# Patient Record
Sex: MALE | Race: WHITE | NOT HISPANIC OR LATINO | URBAN - METROPOLITAN AREA
[De-identification: names, ages, dates, MRNs, and addresses within clinical notes are randomized per-mention and may not be internally consistent; named-entity substitution may affect disease eponyms.]

---

## 2017-12-05 ENCOUNTER — EMERGENCY (EMERGENCY)
Facility: HOSPITAL | Age: 27
LOS: 1 days | Discharge: HOME | End: 2017-12-05
Attending: STUDENT IN AN ORGANIZED HEALTH CARE EDUCATION/TRAINING PROGRAM | Admitting: STUDENT IN AN ORGANIZED HEALTH CARE EDUCATION/TRAINING PROGRAM
Payer: COMMERCIAL

## 2017-12-05 VITALS
OXYGEN SATURATION: 99 % | SYSTOLIC BLOOD PRESSURE: 160 MMHG | TEMPERATURE: 98 F | HEART RATE: 80 BPM | HEIGHT: 68 IN | DIASTOLIC BLOOD PRESSURE: 90 MMHG | WEIGHT: 199.96 LBS | RESPIRATION RATE: 18 BRPM

## 2017-12-05 PROCEDURE — 99283 EMERGENCY DEPT VISIT LOW MDM: CPT

## 2017-12-05 RX ORDER — IBUPROFEN 200 MG
800 TABLET ORAL ONCE
Qty: 0 | Refills: 0 | Status: DISCONTINUED | OUTPATIENT
Start: 2017-12-05 | End: 2017-12-09

## 2017-12-05 RX ORDER — TETANUS TOXOID, REDUCED DIPHTHERIA TOXOID AND ACELLULAR PERTUSSIS VACCINE, ADSORBED 5; 2.5; 8; 8; 2.5 [IU]/.5ML; [IU]/.5ML; UG/.5ML; UG/.5ML; UG/.5ML
0.5 SUSPENSION INTRAMUSCULAR ONCE
Qty: 0 | Refills: 0 | Status: DISCONTINUED | OUTPATIENT
Start: 2017-12-05 | End: 2017-12-09

## 2017-12-05 RX ORDER — METHOCARBAMOL 500 MG/1
1 TABLET, FILM COATED ORAL
Qty: 15 | Refills: 0 | OUTPATIENT
Start: 2017-12-05 | End: 2017-12-10

## 2017-12-05 RX ORDER — METHOCARBAMOL 500 MG/1
1500 TABLET, FILM COATED ORAL ONCE
Qty: 0 | Refills: 0 | Status: DISCONTINUED | OUTPATIENT
Start: 2017-12-05 | End: 2017-12-09

## 2017-12-05 RX ORDER — IBUPROFEN 200 MG
1 TABLET ORAL
Qty: 20 | Refills: 0 | OUTPATIENT
Start: 2017-12-05 | End: 2017-12-10

## 2017-12-05 NOTE — ED PROVIDER NOTE - PROGRESS NOTE DETAILS
After being in ER for approximately 6 hours pt states he can no longer stay for imaging or evaluation. Also just found out his mother was just admitted to another hospital and needs to leave. After being in ER for approximately 6 hours pt states he can no longer stay for imaging or medication. Also just found out his mother was just admitted to another hospital and needs to leave.
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
## 2019-01-20 ENCOUNTER — EMERGENCY (EMERGENCY)
Facility: HOSPITAL | Age: 29
LOS: 1 days | Discharge: HOME | End: 2019-01-20
Attending: STUDENT IN AN ORGANIZED HEALTH CARE EDUCATION/TRAINING PROGRAM
Payer: SELF-PAY

## 2019-01-20 VITALS
HEART RATE: 94 BPM | HEIGHT: 68 IN | DIASTOLIC BLOOD PRESSURE: 68 MMHG | RESPIRATION RATE: 18 BRPM | TEMPERATURE: 99 F | WEIGHT: 184.97 LBS | SYSTOLIC BLOOD PRESSURE: 123 MMHG | OXYGEN SATURATION: 101 %

## 2019-01-20 VITALS
TEMPERATURE: 99 F | RESPIRATION RATE: 18 BRPM | DIASTOLIC BLOOD PRESSURE: 77 MMHG | HEART RATE: 103 BPM | OXYGEN SATURATION: 97 % | SYSTOLIC BLOOD PRESSURE: 108 MMHG

## 2019-01-20 LAB
ALBUMIN SERPL ELPH-MCNC: 4.6 G/DL — SIGNIFICANT CHANGE UP (ref 3.3–5.2)
ALP SERPL-CCNC: 66 U/L — SIGNIFICANT CHANGE UP (ref 40–120)
ALT FLD-CCNC: 43 U/L — HIGH
AMPHET UR-MCNC: NEGATIVE — SIGNIFICANT CHANGE UP
ANION GAP SERPL CALC-SCNC: 16 MMOL/L — SIGNIFICANT CHANGE UP (ref 5–17)
APAP SERPL-MCNC: <7.5 UG/ML — LOW (ref 10–26)
APPEARANCE UR: CLEAR — SIGNIFICANT CHANGE UP
AST SERPL-CCNC: 28 U/L — SIGNIFICANT CHANGE UP
BARBITURATES UR SCN-MCNC: NEGATIVE — SIGNIFICANT CHANGE UP
BASOPHILS # BLD AUTO: 0 K/UL — SIGNIFICANT CHANGE UP (ref 0–0.2)
BENZODIAZ UR-MCNC: NEGATIVE — SIGNIFICANT CHANGE UP
BILIRUB SERPL-MCNC: 0.5 MG/DL — SIGNIFICANT CHANGE UP (ref 0.4–2)
BILIRUB UR-MCNC: NEGATIVE — SIGNIFICANT CHANGE UP
BUN SERPL-MCNC: 14 MG/DL — SIGNIFICANT CHANGE UP (ref 8–20)
CALCIUM SERPL-MCNC: 9.1 MG/DL — SIGNIFICANT CHANGE UP (ref 8.6–10.2)
CHLORIDE SERPL-SCNC: 96 MMOL/L — LOW (ref 98–107)
CO2 SERPL-SCNC: 27 MMOL/L — SIGNIFICANT CHANGE UP (ref 22–29)
COCAINE METAB.OTHER UR-MCNC: POSITIVE
COLOR SPEC: YELLOW — SIGNIFICANT CHANGE UP
CREAT SERPL-MCNC: 1.44 MG/DL — HIGH (ref 0.5–1.3)
DIFF PNL FLD: NEGATIVE — SIGNIFICANT CHANGE UP
EOSINOPHIL # BLD AUTO: 0 K/UL — SIGNIFICANT CHANGE UP (ref 0–0.5)
EOSINOPHIL NFR BLD AUTO: 1 % — SIGNIFICANT CHANGE UP (ref 0–6)
EPI CELLS # UR: SIGNIFICANT CHANGE UP
ETHANOL SERPL-MCNC: <10 MG/DL — SIGNIFICANT CHANGE UP
GLUCOSE SERPL-MCNC: 143 MG/DL — HIGH (ref 70–115)
GLUCOSE UR QL: NEGATIVE MG/DL — SIGNIFICANT CHANGE UP
HCT VFR BLD CALC: 53.3 % — HIGH (ref 42–52)
HGB BLD-MCNC: 17.6 G/DL — SIGNIFICANT CHANGE UP (ref 14–18)
HYALINE CASTS # UR AUTO: ABNORMAL /LPF
KETONES UR-MCNC: NEGATIVE — SIGNIFICANT CHANGE UP
LEUKOCYTE ESTERASE UR-ACNC: NEGATIVE — SIGNIFICANT CHANGE UP
LYMPHOCYTES # BLD AUTO: 0.7 K/UL — LOW (ref 1–4.8)
LYMPHOCYTES # BLD AUTO: 6 % — LOW (ref 20–55)
MCHC RBC-ENTMCNC: 27.3 PG — SIGNIFICANT CHANGE UP (ref 27–31)
MCHC RBC-ENTMCNC: 33 G/DL — SIGNIFICANT CHANGE UP (ref 32–36)
MCV RBC AUTO: 82.8 FL — SIGNIFICANT CHANGE UP (ref 80–94)
METHADONE UR-MCNC: NEGATIVE — SIGNIFICANT CHANGE UP
MONOCYTES # BLD AUTO: 0.1 K/UL — SIGNIFICANT CHANGE UP (ref 0–0.8)
MONOCYTES NFR BLD AUTO: 2 % — LOW (ref 3–10)
NEUTROPHILS # BLD AUTO: 10.6 K/UL — HIGH (ref 1.8–8)
NEUTROPHILS NFR BLD AUTO: 88 % — HIGH (ref 37–73)
NEUTS BAND # BLD: 3 % — SIGNIFICANT CHANGE UP (ref 0–8)
NITRITE UR-MCNC: NEGATIVE — SIGNIFICANT CHANGE UP
OPIATES UR-MCNC: POSITIVE
PCP SPEC-MCNC: SIGNIFICANT CHANGE UP
PCP UR-MCNC: NEGATIVE — SIGNIFICANT CHANGE UP
PH UR: 6.5 — SIGNIFICANT CHANGE UP (ref 5–8)
PLAT MORPH BLD: NORMAL — SIGNIFICANT CHANGE UP
PLATELET # BLD AUTO: 308 K/UL — SIGNIFICANT CHANGE UP (ref 150–400)
POTASSIUM SERPL-MCNC: 3.8 MMOL/L — SIGNIFICANT CHANGE UP (ref 3.5–5.3)
POTASSIUM SERPL-SCNC: 3.8 MMOL/L — SIGNIFICANT CHANGE UP (ref 3.5–5.3)
PROT SERPL-MCNC: 7.9 G/DL — SIGNIFICANT CHANGE UP (ref 6.6–8.7)
PROT UR-MCNC: 100 MG/DL
RBC # BLD: 6.44 M/UL — HIGH (ref 4.6–6.2)
RBC # FLD: 13.3 % — SIGNIFICANT CHANGE UP (ref 11–15.6)
RBC BLD AUTO: NORMAL — SIGNIFICANT CHANGE UP
SALICYLATES SERPL-MCNC: <0.6 MG/DL — LOW (ref 10–20)
SODIUM SERPL-SCNC: 139 MMOL/L — SIGNIFICANT CHANGE UP (ref 135–145)
SP GR SPEC: 1.01 — SIGNIFICANT CHANGE UP (ref 1.01–1.02)
THC UR QL: POSITIVE
TSH SERPL-MCNC: 0.9 UIU/ML — SIGNIFICANT CHANGE UP (ref 0.27–4.2)
UROBILINOGEN FLD QL: NEGATIVE MG/DL — SIGNIFICANT CHANGE UP
WBC # BLD: 11.4 K/UL — HIGH (ref 4.8–10.8)
WBC # FLD AUTO: 11.4 K/UL — HIGH (ref 4.8–10.8)
WBC UR QL: SIGNIFICANT CHANGE UP /HPF (ref 0–5)

## 2019-01-20 PROCEDURE — 99285 EMERGENCY DEPT VISIT HI MDM: CPT

## 2019-01-20 PROCEDURE — 84484 ASSAY OF TROPONIN QUANT: CPT

## 2019-01-20 PROCEDURE — 81001 URINALYSIS AUTO W/SCOPE: CPT

## 2019-01-20 PROCEDURE — 99284 EMERGENCY DEPT VISIT MOD MDM: CPT

## 2019-01-20 PROCEDURE — 36415 COLL VENOUS BLD VENIPUNCTURE: CPT

## 2019-01-20 PROCEDURE — 84443 ASSAY THYROID STIM HORMONE: CPT

## 2019-01-20 PROCEDURE — 80307 DRUG TEST PRSMV CHEM ANLYZR: CPT

## 2019-01-20 PROCEDURE — 82553 CREATINE MB FRACTION: CPT

## 2019-01-20 PROCEDURE — 80053 COMPREHEN METABOLIC PANEL: CPT

## 2019-01-20 PROCEDURE — 85027 COMPLETE CBC AUTOMATED: CPT

## 2019-01-20 PROCEDURE — 93010 ELECTROCARDIOGRAM REPORT: CPT

## 2019-01-20 PROCEDURE — 82550 ASSAY OF CK (CPK): CPT

## 2019-01-20 PROCEDURE — 93005 ELECTROCARDIOGRAM TRACING: CPT

## 2019-01-20 RX ORDER — SODIUM CHLORIDE 9 MG/ML
2000 INJECTION INTRAMUSCULAR; INTRAVENOUS; SUBCUTANEOUS ONCE
Qty: 0 | Refills: 0 | Status: COMPLETED | OUTPATIENT
Start: 2019-01-20 | End: 2019-01-20

## 2019-01-20 RX ADMIN — SODIUM CHLORIDE 2000 MILLILITER(S): 9 INJECTION INTRAMUSCULAR; INTRAVENOUS; SUBCUTANEOUS at 19:56

## 2019-01-20 NOTE — ED ADULT NURSE NOTE - OBJECTIVE STATEMENT
Assumed pt care at 2025.  Pt a&ox4 sent here for overdose, pt states he was clean for 2 years and relapsed a couple weeks ago.  No acute s/s of respiratory distress noted or reported at this time, will continue to monitor

## 2019-01-20 NOTE — ED PROVIDER NOTE - CHPI ED SYMPTOMS NEG
no nausea/no coughing,/no vomiting no vomiting/no coughing, no cp, no sob, no headache, no heart palpitations/no fever/no nausea/no chills
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
## 2019-01-20 NOTE — ED ADULT NURSE REASSESSMENT NOTE - NS ED NURSE REASSESS COMMENT FT1
Pt discharged at this time, attempted to give Narcan Kit as ordered by physician, returned to pt bed and pt left before receiving kit.

## 2019-01-20 NOTE — ED PROVIDER NOTE - MEDICAL DECISION MAKING DETAILS
pt with opiate abuse, will observe for acute respiratory illness. after observation, will again discuss if pt still wants detox.

## 2019-01-20 NOTE — ED ADULT NURSE NOTE - NSIMPLEMENTINTERV_GEN_ALL_ED
Implemented All Universal Safety Interventions:  [place] to call system. Call bell, personal items and telephone within reach. Instruct patient to call for assistance. Room bathroom lighting operational. Non-slip footwear when patient is off stretcher. Physically safe environment: no spills, clutter or unnecessary equipment. Stretcher in lowest position, wheels locked, appropriate side rails in place.

## 2019-01-20 NOTE — ED PROVIDER NOTE - PROGRESS NOTE DETAILS
pt appears comfortable and in bed, states he is ready to go home. had 1 episode of emesis, and was treated for nausea. pt reports his nausea has improved. pt heart rate is currently 101, however, once pt is up and walking hr inc 110-115. discharge. pt appears comfortable and in bed, states he is ready to go home. had 1 episode of emesis, and was treated for nausea. pt reports his nausea has improved. pt heart rate is currently 101, however, once pt is up and walking hr inc 110-115. Patient wants to be discharged and decline assistance for detox..

## 2019-01-20 NOTE — ED PROVIDER NOTE - SKIN, MLM
Skin normal color for race, warm, dry and intact. No evidence of rash. Skin normal color for race, warm, dry and intact. No evidence of rash. no signs of trauma.

## 2019-10-29 ENCOUNTER — EMERGENCY (EMERGENCY)
Facility: HOSPITAL | Age: 29
LOS: 1 days | Discharge: HOME | End: 2019-10-29
Attending: EMERGENCY MEDICINE
Payer: SELF-PAY

## 2019-10-29 VITALS
SYSTOLIC BLOOD PRESSURE: 124 MMHG | OXYGEN SATURATION: 92 % | TEMPERATURE: 99 F | RESPIRATION RATE: 22 BRPM | DIASTOLIC BLOOD PRESSURE: 72 MMHG | HEART RATE: 98 BPM | WEIGHT: 199.96 LBS | HEIGHT: 68 IN

## 2019-10-29 PROCEDURE — 99284 EMERGENCY DEPT VISIT MOD MDM: CPT

## 2019-10-29 PROCEDURE — 99283 EMERGENCY DEPT VISIT LOW MDM: CPT

## 2019-10-29 NOTE — ED ADULT NURSE NOTE - CHPI ED NUR SYMPTOMS NEG
no sleeping issues/no bruising/no acting out behaviors/no headache/no loss of consciousness/no crying/no decreased eating/drinking/no dizziness/no back pain/no neck tenderness/no pain/no difficulty bearing weight/no disorientation/no laceration/no fussiness
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
## 2019-10-29 NOTE — ED PROVIDER NOTE - PHYSICAL EXAMINATION
HEENT: atraumatic, no racoon eyes, no montanez sings, no hemotynpaum, PERRL, EOMI, no nystagmus, no dental injuries  Neck: supple, no midline tenderness to palpation, + FROM, no abrasions, no echymosis  Chest: non tender, equal expansion bilaterally, no echymosis, no abrasions, seatbelt sign negative.  Lungs: CTA, good air entry bilaterally, no wheezing, no rales, no rhonchi  Abdomen: soft, non tender, no guarding, no rebound, no distention, no echymosis  Back: no midline tenderness to palpation   Extremities: atraumatic, + FROM  Skin: no rash  Neuro: A & O x 3, clear speech, steady gait, cerrebellar intact, no focal deficits.
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
## 2019-10-29 NOTE — ED PROVIDER NOTE - ATTENDING CONTRIBUTION TO CARE
Pt. left the ED before I was able to evaluate him. I never saw the patient. Case discussed with the ACP.

## 2019-10-30 ENCOUNTER — EMERGENCY (EMERGENCY)
Facility: HOSPITAL | Age: 29
LOS: 1 days | Discharge: HOME | End: 2019-10-30
Attending: STUDENT IN AN ORGANIZED HEALTH CARE EDUCATION/TRAINING PROGRAM
Payer: SELF-PAY

## 2019-10-30 VITALS
SYSTOLIC BLOOD PRESSURE: 128 MMHG | OXYGEN SATURATION: 94 % | DIASTOLIC BLOOD PRESSURE: 84 MMHG | HEIGHT: 68 IN | TEMPERATURE: 98 F | WEIGHT: 199.96 LBS | HEART RATE: 110 BPM | RESPIRATION RATE: 20 BRPM

## 2019-10-30 PROBLEM — F19.10 OTHER PSYCHOACTIVE SUBSTANCE ABUSE, UNCOMPLICATED: Chronic | Status: ACTIVE | Noted: 2019-01-20

## 2019-10-30 PROCEDURE — 99282 EMERGENCY DEPT VISIT SF MDM: CPT

## 2019-10-30 PROCEDURE — 99053 MED SERV 10PM-8AM 24 HR FAC: CPT

## 2019-10-30 PROCEDURE — 99283 EMERGENCY DEPT VISIT LOW MDM: CPT

## 2019-10-30 NOTE — ED ADULT TRIAGE NOTE - HEART RATE (BEATS/MIN)
Per Dr. Burton, I have performed an EKG on pt. Results faxed to provider. Pt tolerated well. Kailey Fontaine CMA ([facility])      
110

## 2019-10-30 NOTE — ED ADULT TRIAGE NOTE - CHIEF COMPLAINT QUOTE
Sister called because he was doing heroin. as per ems pt nodding in and out hitting self in face to keep awake.  pt speaking in full sentences, smacking self and picking face.  denies SI/HI.  pt changed into yellow gown, belongings out of reach. Sister called because he was doing heroin. as per ems pt nodding in and out hitting self in face to keep awake.  pt speaking in full sentences, smacking self and picking face.  denies SI/HI, denies alcohol.  pt changed into yellow gown, belongings out of reach.

## 2019-10-31 VITALS — OXYGEN SATURATION: 98 % | RESPIRATION RATE: 18 BRPM | HEART RATE: 94 BPM
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
## 2019-10-31 NOTE — ED ADULT NURSE REASSESSMENT NOTE - NS ED NURSE REASSESS COMMENT FT1
pt triaged, treated and dispo by provider, pt verbalized understanding of discharge instructions, refer to provider notes.

## 2019-10-31 NOTE — ED PROVIDER NOTE - CLINICAL SUMMARY MEDICAL DECISION MAKING FREE TEXT BOX
Pt comes in due to an episode of drug use, pt A&Ox4, VSS, NAD. Pt denies SI/HI. Pt has good outpatient follow up.

## 2019-10-31 NOTE — ED PROVIDER NOTE - PATIENT PORTAL LINK FT
You can access the FollowMyHealth Patient Portal offered by [facility] by registering at the following website: [email or web address]. By joining [employer]’s FollowMyHealth portal, you will also be able to view your health information using other applications (apps) compatible with our system.

## 2019-10-31 NOTE — ED PROVIDER NOTE - ATTENDING CONTRIBUTION TO CARE
I personally saw the patient with the PA, and completed the key components of the history and physical exam. I then discussed the management plan with the PA.    pt with pmh of heroin abuse presents after sister called because using heroin, NAD, no resp depression, A&Ox3, has friend at bedside -David - will take him home and states pt poses no risk to himself or others Pt with scracthes on forehead due to dogs which are vaccinated and his tetanus is utd

## 2020-01-31 ENCOUNTER — EMERGENCY (EMERGENCY)
Facility: HOSPITAL | Age: 30
LOS: 1 days | Discharge: HOME | End: 2020-01-31
Attending: EMERGENCY MEDICINE
Payer: COMMERCIAL

## 2020-01-31 VITALS
RESPIRATION RATE: 18 BRPM | TEMPERATURE: 100 F | SYSTOLIC BLOOD PRESSURE: 132 MMHG | OXYGEN SATURATION: 95 % | DIASTOLIC BLOOD PRESSURE: 84 MMHG | HEART RATE: 110 BPM

## 2020-01-31 PROCEDURE — 99284 EMERGENCY DEPT VISIT MOD MDM: CPT

## 2020-01-31 PROCEDURE — 99283 EMERGENCY DEPT VISIT LOW MDM: CPT

## 2020-01-31 PROCEDURE — 82962 GLUCOSE BLOOD TEST: CPT

## 2020-01-31 NOTE — ED PROVIDER NOTE - PATIENT PORTAL LINK FT
You can access the FollowMyHealth Patient Portal offered by [facility] by registering at the following website: [email or web address]. By joining [employer]’s FollowMyHealth portal, you will also be able to view your health information using other applications (apps) compatible with our system.
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
## 2020-01-31 NOTE — ED PROVIDER NOTE - NSFOLLOWUPINSTRUCTIONS_ED_ALL_ED_FT
Substance Use Disorder  Substance use disorder occurs when a person's repeated use of drugs or alcohol interferes with his or her ability to be productive. This disorder can cause problems with mental and physical health. It can affect your ability to have healthy relationships, and it can keep you from being able to meet your responsibilities at work, home, or school. It can also lead to addiction, which is a condition in which the person cannot stop using the substance consistently for a period of time.  The most commonly abused substances include:  Alcohol.Tobacco.Marijuana.Stimulants, such as cocaine and methamphetamine.Hallucinogens, such as LSD and PCP.Opioids, such as some prescription pain medicines and heroin.What are the causes?  This condition may develop due to many complex social, psychological, or physical reasons, such as:  Stress.Abuse.Peer pressure.Anxiety or depression.What increases the risk?  This condition is more likely to develop in people who:  Use substances to cope with stress.Have been abused.Have a mental health disorder, such as depression.Have a family history of substance use disorder.What are the signs or symptoms?  Symptoms of this condition include:  Using the substance for longer periods of time or at a higher dosage than what is normal or intended.Having a lasting desire to use the substance.Being unable to slow down or stop your use of the substance.Spending an abnormal amount of time getting the substance, using the substance, or recovering from using the substance.Craving the substance.Using the substance in a way that interferes with work, school, social activities, and personal relationships.Using the substance even after having negative consequences, such as:  Health problems.Legal or financial troubles.Job loss.Broken relationships.Needing more and more of the substance to get the same effect (developing tolerance).Experiencing unpleasant symptoms if you do not use the substance (withdrawal).Using the substance to avoid withdrawal.How is this diagnosed?  This condition may be diagnosed based on:  A physical exam.Your history of substance use.Your symptoms. This includes:  How substance use affects your life.Changes in personality, behaviors, and mood.Having at least two symptoms of substance use disorder within a 12-month period.Health issues related to substance use, such as liver damage, shortness of breath, fatigue, cough, or heart problems.Blood or urine tests to screen for alcohol and drugs.How is this treated?  This condition may be treated by:  Stopping substance use safely. This may require taking medicines and being closely observed for several days.Taking part in group and individual counseling from mental health providers who help people with substance use disorder.Staying at a live-in (residential) treatment center for several days or weeks.Attending daily counseling sessions at a treatment center.Taking medicine as told by your health care provider:  To ease symptoms and prevent complications during withdrawal.To treat other mental health issues, such as depression or anxiety.To block cravings by causing the same effects as the substance.To block the effects of the substance or replace good sensations with unpleasant ones.Going to a support group to share your experience with others who are going through the same thing.Recovery can be a long process. Many people who undergo treatment start using the substance again after stopping (relapse). If you relapse, that does not mean that treatment will not work.  Follow these instructions at home:     Take over-the-counter and prescription medicines only as told by your health care provider.Do not use any drugs or alcohol.Attend support groups as needed. These groups, including 12-step programs like Alcoholics Anonymous and Narcotics Anonymous, are an important part of long-term recovery for many people.Keep all follow-up visits as told by your health care providers. This is important. This includes continuing to work with therapists and support groups.Contact a health care provider if:  You cannot take your medicines as told.Your symptoms get worse.You have trouble resisting the urge to use drugs or alcohol.Get help right away if you:  Relapse.Think that you may have taken too much of a drug. The hotline of the National Poison Control Center is (399) 479-2048.Have signs of an overdose. Symptoms include:  Chest pain.Confusion.Sleepiness or difficulty staying awake.Slowed breathing.Nausea or vomiting.A seizure.Have serious thoughts about hurting yourself or someone else.Drug overdose is an emergency. Do not wait to see if the symptoms will go away. Get medical help right away. Call your local emergency services (033 in the [place] Do not drive yourself to the hospital.   If you ever feel like you may hurt yourself or others, or have thoughts about taking your own life, get help right away. You can go to your nearest emergency department or call:   Your local emergency services (352 in the [place] A suicide crisis helpline, such as the National Suicide Prevention Lifeline at 1-389.280.4451. This is open 24 hours a day. Summary  Substance use disorder occurs when a person's repeated use of drugs or alcohol interferes with his or her ability to be productive. It can affect your ability to have healthy relationships, keep you from being able to meet your responsibilities at work, home, or school, and lead to addiction.Taking part in group and individual counseling from mental health providers is one possible treatment for people with substance use disorder.Recovery can be a long process. Many people who undergo treatment start using the substance again after stopping (relapse). A relapse does not mean that treatment will not work.Attend support groups as needed, such as Alcoholics Anonymous and Narcotics Anonymous. These groups are an important part of long-term recovery for many people.This information is not intended to replace advice given to you by your health care provider. Make sure you discuss any questions you have with your health care provider.
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
## 2020-01-31 NOTE — ED PROVIDER NOTE - PHYSICAL EXAMINATION
Const: Awake, alert and oriented. In no acute distress. Well appearing.  HEENT: NC/AT. Moist mucous membranes.  Eyes: No scleral icterus. EOMI.  Neck:. Soft and supple. Full ROM without pain.  Cardiac: Regular rate and rhythm. +S1/S2. No murmurs. Peripheral pulses 2+ and symmetric. No LE edema.  Resp: Speaking in full sentences. No evidence of respiratory distress. No wheezes, rales or rhonchi.  Abd: Soft, non-tender, non-distended. Normal bowel sounds in all 4 quadrants. No guarding or rebound.  Back: Spine midline and non-tender. No CVAT.  Skin: No rashes, abrasions or lacerations.

## 2020-01-31 NOTE — ED PROVIDER NOTE - CLINICAL SUMMARY MEDICAL DECISION MAKING FREE TEXT BOX
fully awake alert calm and cooperative during my evaluation; maintains full capacity to make decisions; declines any further ed evaluation; explained risks and benefits of leaving, especially the need for time to observe if substances are still present after narcan wears off; he understands risks and benefits and still wishes to leave; girlfriend coming for escort home

## 2020-10-15 NOTE — ED PROVIDER NOTE - CHIEF COMPLAINT
The patient is a 29y Male complaining of overdose. Information: Selecting Yes will display possible errors in your note based on the variables you have selected. This validation is only offered as a suggestion for you. PLEASE NOTE THAT THE VALIDATION TEXT WILL BE REMOVED WHEN YOU FINALIZE YOUR NOTE. IF YOU WANT TO FAX A PRELIMINARY NOTE YOU WILL NEED TO TOGGLE THIS TO 'NO' IF YOU DO NOT WANT IT IN YOUR FAXED NOTE.

## 2023-01-08 NOTE — ED PROVIDER NOTE - CPE EDP CARDIAC NORM
Pt accepted at [facility]



BY:  Dr. Ramesh



Report to:  [phone]



ETA: Pick-up by [facility] -- 1400 normal...

## 2024-08-07 NOTE — ED ADULT NURSE NOTE - NURSING NEURO LEVEL OF CONSCIOUSNESS
Health Call Center    Phone Message    May a detailed message be left on voicemail: yes     Reason for Call: Other: Malika with Home Health called stating patient has edema on both ankle and feet, blood pressure is fine and pulse is a little high at 95. Malika wanting to know if care team will be able to assist in ordering a life alert since patient is fall risk, and also if care team can assist with hearing aids. Please call Malika back at [phone] to further discuss.     Action Taken: Other: Cardiology    Travel Screening: Not Applicable     Thank you!  [facility]                                                                  
Contacted Malika from Home Care and will contact PCP with concerns and will receive Zio report that was ordered by PCP.    Natacha Mccarthy RN on 8/7/2024 at 1:26 PM    
alert and awake

## 2024-11-18 NOTE — ED PROVIDER NOTE - MUSCULOSKELETAL, MLM
Spinal Block    Patient location during procedure: OR  Start time: 11/18/2024 7:35 AM  Reason for block: primary anesthetic  Staffing  Performed by: Travis Hill DO  Authorized by: Travis Hill DO    Preanesthetic Checklist  Completed: patient identified, IV checked, site marked, risks and benefits discussed, surgical consent, monitors and equipment checked, pre-op evaluation and timeout performed  Spinal Block  Patient position: sitting  Prep: Betadine  Patient monitoring: heart rate, cardiac monitor, continuous pulse ox and frequent blood pressure checks  Approach: midline  Location: L3-4  Needle  Needle type: Pencan   Needle gauge: 24 G  Needle length: 3.5 in  Assessment  Sensory level: T10  Injection Assessment:  negative aspiration for heme, no paresthesia on injection and positive aspiration for clear CSF.  Post-procedure:  sterile dressing applied           Spine appears normal. No midline TTP. Spine appears normal. No midline TTP. Spasm noted to neck and lower back. FROM but does appear to be in discomfort.  Lateral L elbow TTP.